# Patient Record
Sex: FEMALE | Race: WHITE | NOT HISPANIC OR LATINO | URBAN - METROPOLITAN AREA
[De-identification: names, ages, dates, MRNs, and addresses within clinical notes are randomized per-mention and may not be internally consistent; named-entity substitution may affect disease eponyms.]

---

## 2022-06-04 ENCOUNTER — TELEPHONE ENCOUNTER (OUTPATIENT)
Dept: URBAN - METROPOLITAN AREA CLINIC 68 | Facility: CLINIC | Age: 52
End: 2022-06-04

## 2022-06-04 RX ORDER — RIFAXIMIN 550 MG/1
TABLET ORAL
Qty: 42 | Refills: 0 | OUTPATIENT
Start: 2016-07-15 | End: 2016-07-29

## 2022-06-04 RX ORDER — SODIUM PICOSULFATE, MAGNESIUM OXIDE, AND ANHYDROUS CITRIC ACID 10; 3.5; 12 MG/160ML; G/160ML; G/160ML
LIQUID ORAL AS DIRECTED
Qty: 1 | Refills: 0 | OUTPATIENT
Start: 2018-12-05 | End: 2018-12-06

## 2022-06-04 RX ORDER — AMITRIPTYLINE HYDROCHLORIDE 25 MG/1
TABLET, FILM COATED ORAL AT BEDTIME
Qty: 30 | Refills: 30 | OUTPATIENT
Start: 2016-07-15 | End: 2018-12-05

## 2022-06-04 RX ORDER — CIPROFLOXACIN HYDROCHLORIDE 500 MG/1
TABLET, FILM COATED ORAL
Qty: 20 | Refills: 0 | OUTPATIENT
Start: 2019-01-14 | End: 2019-01-24

## 2022-06-04 RX ORDER — HYOSCYAMINE SULFATE 0.12 MG/1
TABLET SUBLINGUAL
Qty: 90 | Refills: 90 | OUTPATIENT
Start: 2016-06-10 | End: 2018-12-05

## 2022-06-04 RX ORDER — CYCLOSPORINE 0.5 MG/ML
RESTASIS( 0.05% OPHTHALMIC   ) UNDEFINED -HX ENTRY EMULSION OPHTHALMIC
OUTPATIENT
Start: 2016-05-03

## 2022-06-04 RX ORDER — CYCLOSPORINE 0.5 MG/ML
RESTASIS( 0.05% OPHTHALMIC   ) INACTIVE -HX ENTRY EMULSION OPHTHALMIC
OUTPATIENT
Start: 2018-12-05

## 2022-06-04 RX ORDER — RIFAXIMIN 550 MG/1
TABLET ORAL
Qty: 42 | Refills: 0 | OUTPATIENT
Start: 2016-06-22 | End: 2016-07-06

## 2022-06-05 ENCOUNTER — TELEPHONE ENCOUNTER (OUTPATIENT)
Dept: URBAN - METROPOLITAN AREA CLINIC 68 | Facility: CLINIC | Age: 52
End: 2022-06-05

## 2022-06-05 RX ORDER — METRONIDAZOLE 500 MG/1
TABLET ORAL
Qty: 30 | Refills: 30 | Status: ACTIVE | COMMUNITY
Start: 2019-01-14

## 2022-06-05 RX ORDER — MOXIFLOXACIN HYDROCHLORIDE TABLETS, 400 MG 400 MG/1
MOXIFLOXACIN HCL( 400MG ORAL  DAILY ) ACTIVE -HX ENTRY TABLET, FILM COATED ORAL DAILY
Status: ACTIVE | COMMUNITY
Start: 2019-02-05

## 2022-06-05 RX ORDER — SODIUM SULFATE, POTASSIUM SULFATE, MAGNESIUM SULFATE 17.5; 3.13; 1.6 G/ML; G/ML; G/ML
SOLUTION, CONCENTRATE ORAL AS DIRECTED
Qty: 1 | Refills: 0 | Status: ACTIVE | COMMUNITY
Start: 2019-02-05

## 2022-06-25 ENCOUNTER — TELEPHONE ENCOUNTER (OUTPATIENT)
Age: 52
End: 2022-06-25

## 2022-06-25 RX ORDER — AMITRIPTYLINE HYDROCHLORIDE 25 MG/1
TABLET, FILM COATED ORAL AT BEDTIME
Qty: 30 | Refills: 30 | OUTPATIENT
Start: 2016-07-15 | End: 2018-12-05

## 2022-06-25 RX ORDER — CIPROFLOXACIN HYDROCHLORIDE 500 MG/1
TABLET, FILM COATED ORAL
Qty: 20 | Refills: 0 | OUTPATIENT
Start: 2019-01-14 | End: 2019-01-24

## 2022-06-25 RX ORDER — CYCLOSPORINE 0.5 MG/ML
RESTASIS( 0.05% OPHTHALMIC   ) UNDEFINED -HX ENTRY EMULSION OPHTHALMIC
OUTPATIENT
Start: 2016-05-03

## 2022-06-25 RX ORDER — HYOSCYAMINE SULFATE 0.12 MG/1
TABLET, ORALLY DISINTEGRATING ORAL
Qty: 90 | Refills: 90 | OUTPATIENT
Start: 2016-06-10 | End: 2018-12-05

## 2022-06-25 RX ORDER — CYCLOSPORINE 0.5 MG/ML
RESTASIS( 0.05% OPHTHALMIC   ) INACTIVE -HX ENTRY EMULSION OPHTHALMIC
OUTPATIENT
Start: 2018-12-05

## 2022-06-25 RX ORDER — RIFAXIMIN 550 MG/1
TABLET ORAL
Qty: 42 | Refills: 0 | OUTPATIENT
Start: 2016-06-22 | End: 2016-07-06

## 2022-06-25 RX ORDER — RIFAXIMIN 550 MG/1
TABLET ORAL
Qty: 42 | Refills: 0 | OUTPATIENT
Start: 2016-07-15 | End: 2016-07-29

## 2022-06-25 RX ORDER — SODIUM PICOSULFATE, MAGNESIUM OXIDE, AND ANHYDROUS CITRIC ACID 10; 3.5; 12 MG/160ML; G/160ML; G/160ML
LIQUID ORAL AS DIRECTED
Qty: 1 | Refills: 0 | OUTPATIENT
Start: 2018-12-05 | End: 2018-12-06

## 2022-06-26 ENCOUNTER — TELEPHONE ENCOUNTER (OUTPATIENT)
Age: 52
End: 2022-06-26

## 2022-06-26 RX ORDER — MOXIFLOXACIN 400 MG/1
MOXIFLOXACIN HCL( 400MG ORAL  DAILY ) ACTIVE -HX ENTRY TABLET, FILM COATED ORAL DAILY
Status: ACTIVE | COMMUNITY
Start: 2019-02-05

## 2022-06-26 RX ORDER — FENUGREEK SEED/BL.THISTLE/ANIS 340 MG
PROBIOTIC(    DAILY ) ACTIVE -HX ENTRY CAPSULE ORAL DAILY
Status: ACTIVE | COMMUNITY
Start: 2019-02-05

## 2022-06-26 RX ORDER — CHROMIUM 200 MCG
VITAMIN D(    DAILY ) ACTIVE -HX ENTRY TABLET ORAL DAILY
Status: ACTIVE | COMMUNITY
Start: 2019-02-05

## 2022-06-26 RX ORDER — METRONIDAZOLE 500 MG/1
TABLET ORAL
Qty: 30 | Refills: 30 | Status: ACTIVE | COMMUNITY
Start: 2019-01-14

## 2022-06-26 RX ORDER — SODIUM SULFATE, POTASSIUM SULFATE, MAGNESIUM SULFATE 17.5; 3.13; 1.6 G/ML; G/ML; G/ML
SOLUTION, CONCENTRATE ORAL AS DIRECTED
Qty: 1 | Refills: 0 | Status: ACTIVE | COMMUNITY
Start: 2019-02-05

## 2023-06-09 ENCOUNTER — CLAIMS CREATED FROM THE CLAIM WINDOW (OUTPATIENT)
Dept: URBAN - METROPOLITAN AREA CLINIC 68 | Facility: CLINIC | Age: 53
End: 2023-06-09
Payer: COMMERCIAL

## 2023-06-09 ENCOUNTER — WEB ENCOUNTER (OUTPATIENT)
Dept: URBAN - METROPOLITAN AREA CLINIC 68 | Facility: CLINIC | Age: 53
End: 2023-06-09

## 2023-06-09 ENCOUNTER — LAB OUTSIDE AN ENCOUNTER (OUTPATIENT)
Dept: URBAN - METROPOLITAN AREA CLINIC 68 | Facility: CLINIC | Age: 53
End: 2023-06-09

## 2023-06-09 VITALS
SYSTOLIC BLOOD PRESSURE: 136 MMHG | HEIGHT: 62 IN | DIASTOLIC BLOOD PRESSURE: 80 MMHG | BODY MASS INDEX: 18.4 KG/M2 | WEIGHT: 100 LBS

## 2023-06-09 DIAGNOSIS — K57.32 DIVERTICULITIS, COLON: ICD-10-CM

## 2023-06-09 DIAGNOSIS — Z86.010 PERSONAL HISTORY OF COLONIC POLYPS: ICD-10-CM

## 2023-06-09 DIAGNOSIS — K58.9 IRRITABLE BOWEL SYNDROME WITHOUT DIARRHEA: ICD-10-CM

## 2023-06-09 PROCEDURE — 99214 OFFICE O/P EST MOD 30 MIN: CPT

## 2023-06-09 RX ORDER — LEVOFLOXACIN 500 MG/1
1 TABLET TABLET, FILM COATED ORAL ONCE A DAY
Qty: 7 TABLET | OUTPATIENT
Start: 2023-06-09 | End: 2023-06-16

## 2023-06-09 RX ORDER — CHROMIUM 200 MCG
VITAMIN D(    DAILY ) ACTIVE -HX ENTRY TABLET ORAL DAILY
Status: ACTIVE | COMMUNITY
Start: 2019-02-05

## 2023-06-09 RX ORDER — LEVOFLOXACIN 500 MG/1
1 TABLET TABLET, FILM COATED ORAL ONCE A DAY
Status: ACTIVE | COMMUNITY

## 2023-06-09 RX ORDER — METRONIDAZOLE 500 MG/1
TABLET ORAL
Qty: 30 | Refills: 30 | Status: DISCONTINUED | COMMUNITY
Start: 2019-01-14

## 2023-06-09 RX ORDER — FENUGREEK SEED/BL.THISTLE/ANIS 340 MG
PROBIOTIC(    DAILY ) ACTIVE -HX ENTRY CAPSULE ORAL DAILY
Status: DISCONTINUED | COMMUNITY
Start: 2019-02-05

## 2023-06-09 NOTE — HPI-TODAY'S VISIT:
Patient is here with suspected episode of diverticulosis.  She went to walk-in clinic Monday where they prescribed levofloxcain 500 mg daily x7 days.  No flagyl as she had an adverse reaction in the past.  She is on day four of the abx and symptoms are improving but she is still having LLQ discomfort.  She did have a fever of 100.0 Monday night.  Will send for CT of the abdomen stat and add 7 more days of levofloxacin.  She will remain on soft diet until symptoms improve.  She does have history of diverticulitis (confirmed in 2018 and suspected earlier this year).  Colonoscopy 2/2019 with 1 polyp, diverticulosis in sigmoid colon, IH.  She will follow up in 2 weeks unless needed to be seen sooner.  She denies nausea/vomiting, rectal bleeding, melena, weight loss.

## 2023-06-16 ENCOUNTER — OFFICE VISIT (OUTPATIENT)
Dept: URBAN - METROPOLITAN AREA CLINIC 68 | Facility: CLINIC | Age: 53
End: 2023-06-16
Payer: COMMERCIAL

## 2023-06-16 ENCOUNTER — LAB OUTSIDE AN ENCOUNTER (OUTPATIENT)
Dept: URBAN - METROPOLITAN AREA CLINIC 68 | Facility: CLINIC | Age: 53
End: 2023-06-16

## 2023-06-16 VITALS
BODY MASS INDEX: 18.4 KG/M2 | WEIGHT: 100 LBS | DIASTOLIC BLOOD PRESSURE: 80 MMHG | SYSTOLIC BLOOD PRESSURE: 124 MMHG | HEIGHT: 62 IN

## 2023-06-16 DIAGNOSIS — Z86.010 PERSONAL HISTORY OF COLONIC POLYPS: ICD-10-CM

## 2023-06-16 DIAGNOSIS — K57.32 DIVERTICULITIS, COLON: ICD-10-CM

## 2023-06-16 DIAGNOSIS — K58.9 IRRITABLE BOWEL SYNDROME WITHOUT DIARRHEA: ICD-10-CM

## 2023-06-16 PROCEDURE — 99214 OFFICE O/P EST MOD 30 MIN: CPT

## 2023-06-16 RX ORDER — SOD SULF/POT CHLORIDE/MAG SULF 1.479 G
AS DIRECTED TABLET ORAL ONCE
Qty: 24 | Refills: 0 | OUTPATIENT
Start: 2023-06-16

## 2023-06-16 RX ORDER — CHROMIUM 200 MCG
VITAMIN D(    DAILY ) ACTIVE -HX ENTRY TABLET ORAL DAILY
COMMUNITY
Start: 2019-02-05

## 2023-06-16 RX ORDER — LEVOFLOXACIN 500 MG/1
1 TABLET TABLET, FILM COATED ORAL ONCE A DAY
COMMUNITY

## 2023-06-16 RX ORDER — LEVOFLOXACIN 500 MG/1
1 TABLET TABLET, FILM COATED ORAL ONCE A DAY
Qty: 14 TABLET | Refills: 0 | OUTPATIENT
Start: 2023-06-16 | End: 2023-06-26

## 2023-06-16 RX ORDER — LEVOFLOXACIN 500 MG/1
1 TABLET TABLET, FILM COATED ORAL ONCE A DAY
Qty: 7 TABLET | COMMUNITY
Start: 2023-06-09 | End: 2023-06-16

## 2023-06-16 NOTE — HPI-TODAY'S VISIT:
Patient is here to follow up after episode of diverticulitis confirmed by CT 6/9/2023.  She completed 14 days of Levaquin and her symptoms have resolved.  She has returned back to normal diet.  She is anxious about having another episode as this was her third one, and she would like to have antibiotics at home in case it does occur again.  She agrees to follow up colonoscopy as her last one was 2/11/2019 with Dr. Khan with 1 polyp, diverticulosis, IH.  Today she denies any nausea/vomiting, abdominal pain, dysphagia, rectal bleeding, melena, weight loss, fever.

## 2023-06-20 ENCOUNTER — OFFICE VISIT (OUTPATIENT)
Dept: URBAN - METROPOLITAN AREA CLINIC 66 | Facility: CLINIC | Age: 53
End: 2023-06-20

## 2023-08-18 ENCOUNTER — OFFICE VISIT (OUTPATIENT)
Dept: URBAN - METROPOLITAN AREA SURGERY CENTER 12 | Facility: SURGERY CENTER | Age: 53
End: 2023-08-18
Payer: COMMERCIAL

## 2023-08-18 ENCOUNTER — CLAIMS CREATED FROM THE CLAIM WINDOW (OUTPATIENT)
Dept: URBAN - METROPOLITAN AREA CLINIC 4 | Facility: CLINIC | Age: 53
End: 2023-08-18
Payer: COMMERCIAL

## 2023-08-18 DIAGNOSIS — K63.5 POLYP OF ASCENDING COLON, UNSPECIFIED TYPE: ICD-10-CM

## 2023-08-18 DIAGNOSIS — K57.30 DIVERTICULOSIS OF LARGE INTESTINE WITHOUT PERFORATION OR ABSCESS WITHOUT BLEEDING: ICD-10-CM

## 2023-08-18 DIAGNOSIS — K64.0 FIRST DEGREE HEMORRHOIDS: ICD-10-CM

## 2023-08-18 DIAGNOSIS — D12.2 BENIGN NEOPLASM OF ASCENDING COLON: ICD-10-CM

## 2023-08-18 DIAGNOSIS — Z86.010 PERSONAL HISTORY OF COLONIC POLYPS: ICD-10-CM

## 2023-08-18 PROBLEM — 724538004: Status: ACTIVE | Noted: 2023-08-18

## 2023-08-18 PROCEDURE — 45385 COLONOSCOPY W/LESION REMOVAL: CPT | Performed by: INTERNAL MEDICINE

## 2023-08-18 PROCEDURE — 88305 TISSUE EXAM BY PATHOLOGIST: CPT | Performed by: PATHOLOGY

## 2023-08-18 PROCEDURE — 00811 ANES LWR INTST NDSC NOS: CPT | Performed by: NURSE ANESTHETIST, CERTIFIED REGISTERED

## 2023-08-18 RX ORDER — LEVOFLOXACIN 500 MG/1
1 TABLET TABLET, FILM COATED ORAL ONCE A DAY
COMMUNITY

## 2023-08-18 RX ORDER — SOD SULF/POT CHLORIDE/MAG SULF 1.479 G
AS DIRECTED TABLET ORAL ONCE
Qty: 24 | Refills: 0 | Status: ACTIVE | COMMUNITY
Start: 2023-06-16

## 2023-08-18 RX ORDER — CHROMIUM 200 MCG
VITAMIN D(    DAILY ) ACTIVE -HX ENTRY TABLET ORAL DAILY
COMMUNITY
Start: 2019-02-05

## 2023-11-14 ENCOUNTER — WEB ENCOUNTER (OUTPATIENT)
Dept: URBAN - METROPOLITAN AREA CLINIC 68 | Facility: CLINIC | Age: 53
End: 2023-11-14

## 2023-11-14 ENCOUNTER — TELEPHONE ENCOUNTER (OUTPATIENT)
Dept: URBAN - METROPOLITAN AREA CLINIC 68 | Facility: CLINIC | Age: 53
End: 2023-11-14

## 2023-11-14 RX ORDER — LEVOFLOXACIN 500 MG/1
1 TABLET TABLET, FILM COATED ORAL ONCE A DAY
Qty: 10 TABLET | Refills: 0

## 2023-11-16 ENCOUNTER — OFFICE VISIT (OUTPATIENT)
Dept: URBAN - METROPOLITAN AREA CLINIC 68 | Facility: CLINIC | Age: 53
End: 2023-11-16
Payer: COMMERCIAL

## 2023-11-16 ENCOUNTER — WEB ENCOUNTER (OUTPATIENT)
Dept: URBAN - METROPOLITAN AREA CLINIC 68 | Facility: CLINIC | Age: 53
End: 2023-11-16

## 2023-11-16 ENCOUNTER — ERX REFILL RESPONSE (OUTPATIENT)
Dept: URBAN - METROPOLITAN AREA CLINIC 68 | Facility: CLINIC | Age: 53
End: 2023-11-16

## 2023-11-16 VITALS
SYSTOLIC BLOOD PRESSURE: 120 MMHG | HEIGHT: 62 IN | WEIGHT: 106 LBS | DIASTOLIC BLOOD PRESSURE: 80 MMHG | BODY MASS INDEX: 19.51 KG/M2

## 2023-11-16 DIAGNOSIS — K58.9 IRRITABLE BOWEL SYNDROME WITHOUT DIARRHEA: ICD-10-CM

## 2023-11-16 DIAGNOSIS — K57.32 DIVERTICULITIS, COLON: ICD-10-CM

## 2023-11-16 DIAGNOSIS — R10.32 LLQ PAIN: ICD-10-CM

## 2023-11-16 PROCEDURE — 99214 OFFICE O/P EST MOD 30 MIN: CPT

## 2023-11-16 RX ORDER — LEVOFLOXACIN 500 MG/1
1 TABLET TABLET, FILM COATED ORAL ONCE A DAY
Qty: 10 TABLET | Refills: 0 | Status: ON HOLD | COMMUNITY

## 2023-11-16 RX ORDER — DICYCLOMINE HYDROCHLORIDE 10 MG/1
1 CAPSULE CAPSULE ORAL
Qty: 180 | Refills: 0 | OUTPATIENT

## 2023-11-16 RX ORDER — DICYCLOMINE HYDROCHLORIDE 10 MG/1
1 CAPSULE CAPSULE ORAL
Qty: 60 | Refills: 3 | OUTPATIENT

## 2023-11-16 RX ORDER — CHROMIUM 200 MCG
VITAMIN D(    DAILY ) ACTIVE -HX ENTRY TABLET ORAL DAILY
Status: ACTIVE | COMMUNITY
Start: 2019-02-05

## 2023-11-16 RX ORDER — SOD SULF/POT CHLORIDE/MAG SULF 1.479 G
AS DIRECTED TABLET ORAL ONCE
Qty: 24 | Refills: 0 | Status: ON HOLD | COMMUNITY
Start: 2023-06-16

## 2023-11-16 RX ORDER — DICYCLOMINE HYDROCHLORIDE 10 MG/1
1 CAPSULE CAPSULE ORAL
Qty: 60 | Refills: 0 | OUTPATIENT
Start: 2023-11-16 | End: 2023-12-16

## 2023-11-16 RX ORDER — DICYCLOMINE HYDROCHLORIDE 10 MG/1
1 CAPSULE CAPSULE ORAL
Qty: 60 | Refills: 0 | OUTPATIENT

## 2023-11-16 NOTE — PHYSICAL EXAM GASTROINTESTINAL
Abdomen , soft, nontender to mild and deep palpation (including LLQ), nondistended , no guarding or rigidity , no masses palpable , normal bowel sounds , Liver and Spleen , no hepatomegaly present , no hepatosplenomegaly , liver nontender , spleen not palpable

## 2023-11-16 NOTE — HPI-TODAY'S VISIT:
52 y/o F with history of colon polyps, diverticulosis, recurrent diverticulitis (since 2018), presenting for evaluation of LLQ pain which onset a couple of days ago. She reports mild, LLQ pain only present with palpation of the area. She states she transitioned to a liquid diet at onset of symptoms, but did trial advancing to solids (pasta) yesterday w/o worsening of symptoms. She notes she has had several episodes of diverticulitis in the past however (most recently 6/2023, as confirmed by CT) and is concerned of a potential recurrence. She denies fevers, diarrhea, bloody stools, or pain w/o mechanical pressure applied to the area. She does share an extensive hx of abx use for diverticulitis and presents today with filled prescriptions for FIGUEROA 500, Cipro 500, and Levo 500. She states she is unable to tolerate levofloxacin and metronidazole, and has otherwise not symptomatically responded to Augmentin in the past. She notes she does like to have antibiotic rx filled ahead of time and available for use in the event of recurring diverticulitis episodes. Discussed antibiotic use and goal to avoid unless necessary and directed by a medical provider. In light of today's mild symptomatology and recent CT Abd/Pelvis (6/2023) and colonoscopy (8/2023), will hold off on radiographic imaging. Reviewed potential benefit of surgical consult however due to recurrent diverticulitis. Pt notes she would like to avoid this, but will consider further at home. Patient denies fevers, chills, nausea, vomiting, dysphagia, odynophagia, heartburn, diarrhea, constipation, melena, hematochezia, or unintentional weight loss.

## 2023-12-01 ENCOUNTER — OFFICE VISIT (OUTPATIENT)
Dept: URBAN - METROPOLITAN AREA CLINIC 68 | Facility: CLINIC | Age: 53
End: 2023-12-01
Payer: COMMERCIAL

## 2023-12-01 VITALS
SYSTOLIC BLOOD PRESSURE: 130 MMHG | HEIGHT: 62 IN | WEIGHT: 106 LBS | BODY MASS INDEX: 19.51 KG/M2 | OXYGEN SATURATION: 99 % | DIASTOLIC BLOOD PRESSURE: 74 MMHG | HEART RATE: 89 BPM

## 2023-12-01 DIAGNOSIS — R10.32 LLQ PAIN: ICD-10-CM

## 2023-12-01 DIAGNOSIS — K58.9 IRRITABLE BOWEL SYNDROME WITHOUT DIARRHEA: ICD-10-CM

## 2023-12-01 DIAGNOSIS — K57.32 DIVERTICULITIS, COLON: ICD-10-CM

## 2023-12-01 PROBLEM — 301716002: Status: ACTIVE | Noted: 2023-11-16

## 2023-12-01 PROCEDURE — 99213 OFFICE O/P EST LOW 20 MIN: CPT

## 2023-12-01 RX ORDER — SOD SULF/POT CHLORIDE/MAG SULF 1.479 G
AS DIRECTED TABLET ORAL ONCE
Qty: 24 | Refills: 0 | Status: ON HOLD | COMMUNITY
Start: 2023-06-16

## 2023-12-01 RX ORDER — DICYCLOMINE HYDROCHLORIDE 10 MG/1
1 CAPSULE CAPSULE ORAL
Qty: 60 | Refills: 3 | Status: DISCONTINUED | COMMUNITY

## 2023-12-01 RX ORDER — CHROMIUM 200 MCG
VITAMIN D(    DAILY ) ACTIVE -HX ENTRY TABLET ORAL DAILY
Status: ACTIVE | COMMUNITY
Start: 2019-02-05

## 2023-12-01 RX ORDER — LEVOFLOXACIN 500 MG/1
1 TABLET TABLET, FILM COATED ORAL ONCE A DAY
Qty: 10 TABLET | Refills: 0 | Status: ON HOLD | COMMUNITY

## 2023-12-01 NOTE — HPI-TODAY'S VISIT:
54 y/o F with history of colon polyps, diverticulosis, recurrent diverticulitis (since 2018 and most recently 6/2023 via CT), presenting for follow up of LLQ pain. She states pain has resolved since onset. She reports she was previously concerned pain may be indication of possible diverticulitis flare, although she denies progressive pain, fever, diarrhea, or GI bleeding. She does admit to worrying of possible recurrence of diverticulitis and has pursued empiric abx therapy in the past, including  FIGUEROA, Cipro, Levo, and Augmentin. She notes she is unable to tolerate Levo and FIGUEROA and feels she has not responded to augmentin in the past. She denies using abx in this recent episode of LLQ pain and has slowly advanced her diet back to her baseline w/o recurrence of symptoms and LLQ pain is now resolved. She otherwise notes a hx of IBS and can use bentyl as needed for abdominal cramping. Return to clinic with the development new, recurring, or worsening symptoms. Patient denies fevers, chills, nausea, vomiting, dysphagia, odynophagia, heartburn, abdominal pain, diarrhea, constipation, melena, hematochezia, or unintentional weight loss.

## 2023-12-15 ENCOUNTER — OFFICE VISIT (OUTPATIENT)
Dept: URBAN - METROPOLITAN AREA CLINIC 68 | Facility: CLINIC | Age: 53
End: 2023-12-15

## 2024-04-02 ENCOUNTER — LAB (OUTPATIENT)
Dept: URBAN - METROPOLITAN AREA CLINIC 68 | Facility: CLINIC | Age: 54
End: 2024-04-02

## 2024-04-02 ENCOUNTER — OV EP (OUTPATIENT)
Dept: URBAN - METROPOLITAN AREA CLINIC 68 | Facility: CLINIC | Age: 54
End: 2024-04-02
Payer: COMMERCIAL

## 2024-04-02 VITALS
HEART RATE: 87 BPM | WEIGHT: 104 LBS | SYSTOLIC BLOOD PRESSURE: 128 MMHG | OXYGEN SATURATION: 99 % | DIASTOLIC BLOOD PRESSURE: 80 MMHG | HEIGHT: 62 IN | TEMPERATURE: 97.9 F | BODY MASS INDEX: 19.14 KG/M2

## 2024-04-02 DIAGNOSIS — K58.9 IRRITABLE BOWEL SYNDROME WITHOUT DIARRHEA: ICD-10-CM

## 2024-04-02 DIAGNOSIS — R10.32 LLQ PAIN: ICD-10-CM

## 2024-04-02 DIAGNOSIS — K57.32 DIVERTICULITIS, COLON: ICD-10-CM

## 2024-04-02 PROCEDURE — 99213 OFFICE O/P EST LOW 20 MIN: CPT

## 2024-04-02 RX ORDER — LEVOFLOXACIN 500 MG/1
1 TABLET TABLET, FILM COATED ORAL ONCE A DAY
Qty: 10 TABLET | Refills: 0 | Status: ON HOLD | COMMUNITY

## 2024-04-02 RX ORDER — SOD SULF/POT CHLORIDE/MAG SULF 1.479 G
AS DIRECTED TABLET ORAL ONCE
Qty: 24 | Refills: 0 | Status: ON HOLD | COMMUNITY
Start: 2023-06-16

## 2024-04-02 RX ORDER — CHROMIUM 200 MCG
VITAMIN D(    DAILY ) ACTIVE -HX ENTRY TABLET ORAL DAILY
Status: ACTIVE | COMMUNITY
Start: 2019-02-05

## 2024-04-02 NOTE — PHYSICAL EXAM CHEST:
no lesions, no deformities, no traumatic injuries, no significant scars are present, chest wall non-tender, no masses present, breathing is unlabored without accessory muscle use,normal breath sounds [facility]isted living facility

## 2024-04-02 NOTE — HPI-TODAY'S VISIT:
52 y/o F with history of colon polyps, diverticulosis, recurrent diverticulitis (since 2018 and most recently 6/2023 via CT), presenting for evaluation of newly onset LLQ pain. She notes pain is characteristic of previous episodes of diverticulitis and was prescribed Augmentin by her PCP, although she has not yet started this. She notes pain waxes and wanes from 2/10-4/10 and is especially worse if she pushes on affected area. She states she is currently in a clear liquid diet and denies rectal bldding or diarrhea. She di experience a trnaient fever last night of 100F which responded to tylenol. She has a long hx of abx use in the past, including recurrent courses of Cipro/FIGUEROA, Levo, and Augmentin. She otherwise notes a hx of IBS and can use bentyl as needed for abdominal cramping. Patient denies chills, nausea, vomiting, dysphagia, odynophagia, heartburn, diarrhea, constipation, melena, hematochezia, or unintentional weight loss.

## 2024-04-02 NOTE — PHYSICAL EXAM GASTROINTESTINAL
Abdomen , soft, tender to mild-mod palpation at LLQ (o/w unremarkable), nondistended , no guarding or rigidity , no masses palpable , normal bowel sounds , Liver and Spleen , no hepatomegaly present , no hepatosplenomegaly , liver nontender , spleen not palpable

## 2024-04-12 ENCOUNTER — OV EP (OUTPATIENT)
Dept: URBAN - METROPOLITAN AREA CLINIC 68 | Facility: CLINIC | Age: 54
End: 2024-04-12
Payer: COMMERCIAL

## 2024-04-12 VITALS
HEIGHT: 62 IN | SYSTOLIC BLOOD PRESSURE: 128 MMHG | BODY MASS INDEX: 19.14 KG/M2 | DIASTOLIC BLOOD PRESSURE: 84 MMHG | HEART RATE: 86 BPM | TEMPERATURE: 98.8 F | OXYGEN SATURATION: 99 % | WEIGHT: 104 LBS

## 2024-04-12 DIAGNOSIS — R10.32 LLQ PAIN: ICD-10-CM

## 2024-04-12 DIAGNOSIS — K58.9 IRRITABLE BOWEL SYNDROME WITHOUT DIARRHEA: ICD-10-CM

## 2024-04-12 DIAGNOSIS — K57.32 DIVERTICULITIS, COLON: ICD-10-CM

## 2024-04-12 PROCEDURE — 99214 OFFICE O/P EST MOD 30 MIN: CPT

## 2024-04-12 RX ORDER — AMOXICILLIN AND CLAVULANATE POTASSIUM 875; 125 MG/1; MG/1
1 TABLET TABLET, FILM COATED ORAL
Qty: 8 TABLET | Refills: 0 | OUTPATIENT
Start: 2024-04-12 | End: 2024-04-16

## 2024-04-12 RX ORDER — CHROMIUM 200 MCG
VITAMIN D(    DAILY ) ACTIVE -HX ENTRY TABLET ORAL DAILY
Status: ACTIVE | COMMUNITY
Start: 2019-02-05

## 2024-04-12 NOTE — HPI-TODAY'S VISIT:
52 y/o F with history of colon polyps, diverticulosis, recurrent diverticulitis (since 2018 and most recently 6/2023 via CT), presenting for follow up of diverticulitis (CT 4/2024) s/p 10-day course Augmentin. She notes significant improvement of LLQ pain but does have some mild residual pain. She notes she has had 3 episodes of diverticulitis since June of 2023. She would like to hold off from surgical consult at this time, although she is amenable to discuss in the future in light of repeated episodes of diverticulitis. She is tolerating liquids and solids well. She is avoiding high fiber diet/supplements at this time. Patient denies chills, nausea, vomiting, dysphagia, odynophagia, heartburn, diarrhea, constipation, melena, hematochezia, or unintentional weight loss.

## 2024-04-12 NOTE — PHYSICAL EXAM GASTROINTESTINAL
Abdomen , soft, nontender, mildly tender to deep palpation of LLQ (o/w unremarkable), distended , no guarding or rigidity , no masses palpable , normal bowel sounds , Liver and Spleen , no hepatomegaly present , no hepatosplenomegaly , liver nontender , spleen not palpable

## 2025-05-09 ENCOUNTER — NEW PATIENT (OUTPATIENT)
Age: 55
End: 2025-05-09

## 2025-05-09 DIAGNOSIS — H04.123: ICD-10-CM

## 2025-05-09 DIAGNOSIS — B30.9: ICD-10-CM

## 2025-05-09 PROCEDURE — 99203 OFFICE O/P NEW LOW 30 MIN: CPT

## 2025-05-09 RX ORDER — LOTEPREDNOL ETABONATE 3.8 MG/G: 1 GEL OPHTHALMIC TWICE A DAY

## 2025-05-21 ENCOUNTER — DASHBOARD ENCOUNTERS (OUTPATIENT)
Age: 55
End: 2025-05-21

## 2025-05-21 ENCOUNTER — OFFICE VISIT (OUTPATIENT)
Dept: URBAN - METROPOLITAN AREA CLINIC 68 | Facility: CLINIC | Age: 55
End: 2025-05-21
Payer: COMMERCIAL

## 2025-05-21 DIAGNOSIS — R14.0 BLOATING: ICD-10-CM

## 2025-05-21 DIAGNOSIS — K58.0 IRRITABLE BOWEL SYNDROME WITH DIARRHEA: ICD-10-CM

## 2025-05-21 PROBLEM — 197125005: Status: ACTIVE | Noted: 2025-05-21

## 2025-05-21 PROCEDURE — 99214 OFFICE O/P EST MOD 30 MIN: CPT | Performed by: INTERNAL MEDICINE

## 2025-05-21 RX ORDER — RIFAXIMIN 550 MG/1
1 TABLET TABLET ORAL THREE TIMES A DAY
Qty: 42 TABLET | Refills: 2 | OUTPATIENT
Start: 2025-05-21 | End: 2025-07-02

## 2025-05-21 RX ORDER — CHROMIUM 200 MCG
VITAMIN D(    DAILY ) ACTIVE -HX ENTRY TABLET ORAL DAILY
Status: ACTIVE | COMMUNITY
Start: 2019-02-05

## 2025-05-21 NOTE — HPI-TODAY'S VISIT:
56 y/o F with history of colon polyps, diverticulosis, recurrent diverticulitis (since 2018 and most recently 6/2023 via CT),  who is here for significant abdominal bloating. She did have a URI and did take antibiotics in April. She has tried low FODMAP diet.  She does have a history of diverticulitis (CT 4/2024) s/p 10-day course Augmentin.  She defers SIBO breath testing and would like to try Ibgard and Xifaxan for IBS-D.  She notes she has had 3 episodes of diverticulitis since June of 2023.

## 2025-05-22 ENCOUNTER — TELEPHONE ENCOUNTER (OUTPATIENT)
Dept: URBAN - METROPOLITAN AREA CLINIC 68 | Facility: CLINIC | Age: 55
End: 2025-05-22

## 2025-06-02 ENCOUNTER — TELEPHONE ENCOUNTER (OUTPATIENT)
Dept: URBAN - METROPOLITAN AREA CLINIC 68 | Facility: CLINIC | Age: 55
End: 2025-06-02

## 2025-06-05 ENCOUNTER — TELEPHONE ENCOUNTER (OUTPATIENT)
Dept: URBAN - METROPOLITAN AREA CLINIC 68 | Facility: CLINIC | Age: 55
End: 2025-06-05

## 2025-06-19 ENCOUNTER — COMPREHENSIVE EXAM (OUTPATIENT)
Age: 55
End: 2025-06-19

## 2025-06-19 DIAGNOSIS — H57.813: ICD-10-CM

## 2025-06-19 DIAGNOSIS — H04.123: ICD-10-CM

## 2025-06-19 DIAGNOSIS — H43.813: ICD-10-CM

## 2025-06-19 PROCEDURE — 99214 OFFICE O/P EST MOD 30 MIN: CPT

## 2025-06-19 PROCEDURE — 68761Q PUNCTAL PLUG/QUINTESS DISSOLVABLE PLUG/EACH

## 2025-06-19 PROCEDURE — A4262 TEMPORARY TEAR DUCT PLUG: HCPCS

## 2025-06-27 ENCOUNTER — LAB OUTSIDE AN ENCOUNTER (OUTPATIENT)
Dept: URBAN - METROPOLITAN AREA CLINIC 68 | Facility: CLINIC | Age: 55
End: 2025-06-27

## 2025-06-27 ENCOUNTER — OFFICE VISIT (OUTPATIENT)
Dept: URBAN - METROPOLITAN AREA CLINIC 68 | Facility: CLINIC | Age: 55
End: 2025-06-27
Payer: COMMERCIAL

## 2025-06-27 DIAGNOSIS — R14.0 ABDOMINAL BLOATING: ICD-10-CM

## 2025-06-27 DIAGNOSIS — K57.92 DIVERTICULITIS: ICD-10-CM

## 2025-06-27 DIAGNOSIS — K58.0 IRRITABLE BOWEL SYNDROME WITH DIARRHEA: ICD-10-CM

## 2025-06-27 PROBLEM — 307496006: Status: ACTIVE | Noted: 2025-06-27

## 2025-06-27 PROBLEM — 116289008: Status: ACTIVE | Noted: 2025-06-27

## 2025-06-27 PROCEDURE — 99214 OFFICE O/P EST MOD 30 MIN: CPT

## 2025-06-27 RX ORDER — RIFAXIMIN 550 MG/1
1 TABLET TABLET ORAL THREE TIMES A DAY
Qty: 42 TABLET | Refills: 2 | Status: ACTIVE | COMMUNITY
Start: 2025-05-21 | End: 2025-07-02

## 2025-06-27 RX ORDER — CHROMIUM 200 MCG
VITAMIN D(    DAILY ) ACTIVE -HX ENTRY TABLET ORAL DAILY
Status: ACTIVE | COMMUNITY
Start: 2019-02-05

## 2025-06-27 NOTE — HPI-TODAY'S VISIT:
54 y/o F with history of colon polyps, diverticulosis, recurrent diverticulitis (since 2018 and most recently 6/2023 via CT),  who is here for f/u of significant abdominal bloating. She did have a URI and did take antibiotics in April. She has tried low FODMAP diet.  She does have a history of diverticulitis (CT 4/2024) s/p 10-day course Augmentin.  She deferred SIBO breath testing and trialed Ibgard and Xifaxan for IBS-D. She is on day 10 of Xifaxan and has not noticed relief. She is recommended to finish the course and will also order a CT scan. She notes she has had 3 episodes of diverticulitis since June of 2023. She is agreeable to have a colonoscopy in the near future.

## 2025-07-10 ENCOUNTER — TELEPHONE ENCOUNTER (OUTPATIENT)
Dept: URBAN - METROPOLITAN AREA CLINIC 68 | Facility: CLINIC | Age: 55
End: 2025-07-10

## 2025-07-11 ENCOUNTER — LAB OUTSIDE AN ENCOUNTER (OUTPATIENT)
Dept: URBAN - METROPOLITAN AREA CLINIC 68 | Facility: CLINIC | Age: 55
End: 2025-07-11

## 2025-07-11 ENCOUNTER — OFFICE VISIT (OUTPATIENT)
Dept: URBAN - METROPOLITAN AREA CLINIC 68 | Facility: CLINIC | Age: 55
End: 2025-07-11
Payer: COMMERCIAL

## 2025-07-11 DIAGNOSIS — K57.92 DIVERTICULITIS: ICD-10-CM

## 2025-07-11 DIAGNOSIS — R14.0 ABDOMINAL BLOATING: ICD-10-CM

## 2025-07-11 DIAGNOSIS — K58.0 IRRITABLE BOWEL SYNDROME WITH DIARRHEA: ICD-10-CM

## 2025-07-11 PROCEDURE — 99214 OFFICE O/P EST MOD 30 MIN: CPT

## 2025-07-11 RX ORDER — PANCRELIPASE LIPASE, PANCRELIPASE PROTEASE, PANCRELIPASE AMYLASE 40000; 126000; 168000 [USP'U]/1; [USP'U]/1; [USP'U]/1
AS DIRECTED CAPSULE, DELAYED RELEASE ORAL
OUTPATIENT
Start: 2025-07-11

## 2025-07-11 RX ORDER — CHROMIUM 200 MCG
VITAMIN D(    DAILY ) ACTIVE -HX ENTRY TABLET ORAL DAILY
Status: ACTIVE | COMMUNITY
Start: 2019-02-05

## 2025-07-11 RX ORDER — SOD SULF/POT CHLORIDE/MAG SULF 1.479 G
12 TABLETS THE FIRST DOSE THE EVENING BEFORE AND SECOND DOSE THE MORNING OF COLONOSCOPY TABLET ORAL TWICE A DAY
Qty: 24 | OUTPATIENT
Start: 2025-07-11 | End: 2025-07-12

## 2025-07-11 NOTE — HPI-TODAY'S VISIT:
56 y/o F with history of colon polyps, diverticulosis, recurrent diverticulitis (since 2018 and most recently 2024),  who is here for CT results for evaluation of significant abdominal bloating. CT 7/9/25 showed only diverticulosis. She did have a URI and did take antibiotics in April. She has tried low FODMAP diet.  She does have a history of diverticulitis (CT 4/2024) s/p 10-day course Augmentin.  She deferred SIBO breath testing and trialed Ibgard and Xifaxan for IBS-D without relief. She notes she has had 3 episodes of diverticulitis since June of 2023. She is recommended to have a colonoscopy. Will trial Zenpep for 2 weeks and check a celiac panel. She is scheduled for a transvaginal US next week.

## 2025-07-18 LAB
IMMUNOGLOBULIN A: 148
INTERPRETATION: (no result)
TISSUE TRANSGLUTAMINASE AB, IGA: <1

## 2025-07-25 ENCOUNTER — OFFICE VISIT (OUTPATIENT)
Dept: URBAN - METROPOLITAN AREA CLINIC 68 | Facility: CLINIC | Age: 55
End: 2025-07-25
Payer: COMMERCIAL

## 2025-07-25 DIAGNOSIS — R14.0 ABDOMINAL BLOATING: ICD-10-CM

## 2025-07-25 DIAGNOSIS — K58.0 IRRITABLE BOWEL SYNDROME WITH DIARRHEA: ICD-10-CM

## 2025-07-25 DIAGNOSIS — K57.92 DIVERTICULITIS: ICD-10-CM

## 2025-07-25 PROCEDURE — 99213 OFFICE O/P EST LOW 20 MIN: CPT

## 2025-07-25 RX ORDER — CHROMIUM 200 MCG
VITAMIN D(    DAILY ) ACTIVE -HX ENTRY TABLET ORAL DAILY
Status: ACTIVE | COMMUNITY
Start: 2019-02-05

## 2025-07-25 RX ORDER — PANCRELIPASE LIPASE, PANCRELIPASE PROTEASE, PANCRELIPASE AMYLASE 40000; 126000; 168000 [USP'U]/1; [USP'U]/1; [USP'U]/1
AS DIRECTED CAPSULE, DELAYED RELEASE ORAL
Status: ACTIVE | COMMUNITY
Start: 2025-07-11

## 2025-07-25 NOTE — HPI-TODAY'S VISIT:
54 y/o F with history of colon polyps, diverticulosis, recurrent diverticulitis (since 2018 and most recently 2024), who is here for follow up of abdominal bloating. CT 7/9/25 showed only diverticulosis. She had a negative celiac panel. She did have a URI and did take antibiotics in April. She has tried low FODMAP diet. Denies constipation or diarrhea. She does have a history of diverticulitis (CT 4/2024) s/p 10-day course Augmentin.  She deferred SIBO breath testing and trialed Ibgard, Simethicone, and Xifaxan for IBS-D without relief. She notes she has had 3 episodes of diverticulitis since June of 2023. She is scheduled for a colonoscopy on 8/15. Zenpep samples were given to patient at last appointment, but she has not started them yet. She is recommended to begin Zenpep. She had a transvaginal US last week that was found unremarkable. She will be following up with her OBGYN next week.

## 2025-07-30 ENCOUNTER — WEB ENCOUNTER (OUTPATIENT)
Dept: URBAN - METROPOLITAN AREA CLINIC 68 | Facility: CLINIC | Age: 55
End: 2025-07-30

## 2025-07-31 ENCOUNTER — WEB ENCOUNTER (OUTPATIENT)
Dept: URBAN - METROPOLITAN AREA CLINIC 68 | Facility: CLINIC | Age: 55
End: 2025-07-31

## 2025-08-06 ENCOUNTER — WEB ENCOUNTER (OUTPATIENT)
Dept: URBAN - METROPOLITAN AREA CLINIC 68 | Facility: CLINIC | Age: 55
End: 2025-08-06

## 2025-08-06 ENCOUNTER — OFFICE VISIT (OUTPATIENT)
Dept: URBAN - METROPOLITAN AREA CLINIC 68 | Facility: CLINIC | Age: 55
End: 2025-08-06
Payer: COMMERCIAL

## 2025-08-06 ENCOUNTER — TELEPHONE ENCOUNTER (OUTPATIENT)
Dept: URBAN - METROPOLITAN AREA CLINIC 68 | Facility: CLINIC | Age: 55
End: 2025-08-06

## 2025-08-06 DIAGNOSIS — Z86.0100 HISTORY OF COLON POLYPS: ICD-10-CM

## 2025-08-06 DIAGNOSIS — K58.0 IRRITABLE BOWEL SYNDROME WITH DIARRHEA: ICD-10-CM

## 2025-08-06 DIAGNOSIS — R14.0 ABDOMINAL BLOATING: ICD-10-CM

## 2025-08-06 PROBLEM — 428283002: Status: ACTIVE | Noted: 2025-08-06

## 2025-08-06 PROCEDURE — 99214 OFFICE O/P EST MOD 30 MIN: CPT | Performed by: INTERNAL MEDICINE

## 2025-08-06 RX ORDER — CHROMIUM 200 MCG
VITAMIN D(    DAILY ) ACTIVE -HX ENTRY TABLET ORAL DAILY
Status: ACTIVE | COMMUNITY
Start: 2019-02-05

## 2025-08-06 RX ORDER — PANCRELIPASE LIPASE, PANCRELIPASE PROTEASE, PANCRELIPASE AMYLASE 40000; 126000; 168000 [USP'U]/1; [USP'U]/1; [USP'U]/1
AS DIRECTED CAPSULE, DELAYED RELEASE ORAL
Status: ACTIVE | COMMUNITY
Start: 2025-07-11

## 2025-08-15 ENCOUNTER — OFFICE VISIT (OUTPATIENT)
Dept: URBAN - METROPOLITAN AREA SURGERY CENTER 12 | Facility: SURGERY CENTER | Age: 55
End: 2025-08-15

## 2025-08-21 ENCOUNTER — TELEPHONE ENCOUNTER (OUTPATIENT)
Dept: URBAN - METROPOLITAN AREA CLINIC 68 | Facility: CLINIC | Age: 55
End: 2025-08-21

## 2025-08-21 ENCOUNTER — WEB ENCOUNTER (OUTPATIENT)
Dept: URBAN - METROPOLITAN AREA CLINIC 68 | Facility: CLINIC | Age: 55
End: 2025-08-21

## 2025-08-21 RX ORDER — RIFAXIMIN 550 MG/1
1 TABLET TABLET ORAL THREE TIMES A DAY
Qty: 42 TABLET | OUTPATIENT
Start: 2025-08-21 | End: 2025-09-04

## 2025-08-22 ENCOUNTER — WEB ENCOUNTER (OUTPATIENT)
Dept: URBAN - METROPOLITAN AREA CLINIC 68 | Facility: CLINIC | Age: 55
End: 2025-08-22

## 2025-08-29 ENCOUNTER — OFFICE VISIT (OUTPATIENT)
Dept: URBAN - METROPOLITAN AREA CLINIC 68 | Facility: CLINIC | Age: 55
End: 2025-08-29
Payer: COMMERCIAL

## 2025-08-29 DIAGNOSIS — Z87.19 HISTORY OF DIVERTICULITIS: ICD-10-CM

## 2025-08-29 DIAGNOSIS — K63.8211 SMALL INTESTINAL BACTERIAL OVERGROWTH (SIBO), HYDROGEN SUBTYPE: ICD-10-CM

## 2025-08-29 DIAGNOSIS — R14.0 BLOATING: ICD-10-CM

## 2025-08-29 PROBLEM — 116289008: Status: ACTIVE | Noted: 2025-08-29

## 2025-08-29 PROBLEM — 118361000119100: Status: ACTIVE | Noted: 2025-08-29

## 2025-08-29 PROCEDURE — 99213 OFFICE O/P EST LOW 20 MIN: CPT | Performed by: INTERNAL MEDICINE

## 2025-08-29 RX ORDER — CHROMIUM 200 MCG
VITAMIN D(    DAILY ) ACTIVE -HX ENTRY TABLET ORAL DAILY
Status: ACTIVE | COMMUNITY
Start: 2019-02-05

## 2025-08-29 RX ORDER — RIFAXIMIN 550 MG/1
1 TABLET TABLET ORAL THREE TIMES A DAY
Qty: 42 TABLET | Status: ACTIVE | COMMUNITY
Start: 2025-08-21 | End: 2025-09-04

## 2025-08-29 RX ORDER — PANCRELIPASE LIPASE, PANCRELIPASE PROTEASE, PANCRELIPASE AMYLASE 40000; 126000; 168000 [USP'U]/1; [USP'U]/1; [USP'U]/1
AS DIRECTED CAPSULE, DELAYED RELEASE ORAL
Status: ACTIVE | COMMUNITY
Start: 2025-07-11

## 2025-08-29 RX ORDER — SULFAMETHOXAZOLE AND TRIMETHOPRIM 800; 160 MG/1; MG/1
1 TABLET TABLET ORAL TWICE A DAY
Qty: 28 TABLET | OUTPATIENT
Start: 2025-08-29 | End: 2025-09-12